# Patient Record
Sex: MALE | Race: WHITE | NOT HISPANIC OR LATINO | Employment: OTHER | URBAN - METROPOLITAN AREA
[De-identification: names, ages, dates, MRNs, and addresses within clinical notes are randomized per-mention and may not be internally consistent; named-entity substitution may affect disease eponyms.]

---

## 2024-01-29 ENCOUNTER — TELEPHONE (OUTPATIENT)
Dept: PRIMARY CARE | Facility: CLINIC | Age: 74
End: 2024-01-29
Payer: MEDICARE

## 2024-01-29 DIAGNOSIS — E78.1 ESSENTIAL HYPERTRIGLYCERIDEMIA: ICD-10-CM

## 2024-01-29 DIAGNOSIS — R79.89 ELEVATED FERRITIN: ICD-10-CM

## 2024-01-29 DIAGNOSIS — E78.49 OTHER HYPERLIPIDEMIA: ICD-10-CM

## 2024-01-29 DIAGNOSIS — R73.01 IMPAIRED FASTING GLUCOSE: ICD-10-CM

## 2024-01-29 DIAGNOSIS — N18.31 STAGE 3A CHRONIC KIDNEY DISEASE (MULTI): Primary | ICD-10-CM

## 2024-01-29 DIAGNOSIS — Z13.29 THYROID DISORDER SCREEN: ICD-10-CM

## 2024-01-29 DIAGNOSIS — Z12.5 SCREENING FOR PROSTATE CANCER: ICD-10-CM

## 2024-01-29 DIAGNOSIS — E55.9 VITAMIN D DEFICIENCY: ICD-10-CM

## 2024-01-29 PROBLEM — R91.1 LUNG NODULE: Status: ACTIVE | Noted: 2024-01-29

## 2024-01-29 PROBLEM — C49.20: Status: ACTIVE | Noted: 2024-01-29

## 2024-01-29 PROBLEM — R59.0 HILAR LYMPHADENOPATHY: Status: ACTIVE | Noted: 2024-01-29

## 2024-01-29 PROBLEM — K76.89 LIVER CYST: Status: ACTIVE | Noted: 2024-01-29

## 2024-01-29 PROBLEM — N52.9 ERECTILE DYSFUNCTION OF ORGANIC ORIGIN: Status: ACTIVE | Noted: 2024-01-29

## 2024-01-29 PROBLEM — N20.0 CALCULUS OF KIDNEY IN MALE: Status: ACTIVE | Noted: 2024-01-29

## 2024-01-30 ENCOUNTER — LAB (OUTPATIENT)
Dept: LAB | Facility: LAB | Age: 74
End: 2024-01-30
Payer: MEDICARE

## 2024-01-30 DIAGNOSIS — R73.01 IMPAIRED FASTING GLUCOSE: ICD-10-CM

## 2024-01-30 DIAGNOSIS — Z12.5 SCREENING FOR PROSTATE CANCER: ICD-10-CM

## 2024-01-30 DIAGNOSIS — Z13.29 THYROID DISORDER SCREEN: ICD-10-CM

## 2024-01-30 DIAGNOSIS — E78.49 OTHER HYPERLIPIDEMIA: ICD-10-CM

## 2024-01-30 DIAGNOSIS — E55.9 VITAMIN D DEFICIENCY: ICD-10-CM

## 2024-01-30 DIAGNOSIS — N18.31 STAGE 3A CHRONIC KIDNEY DISEASE (MULTI): ICD-10-CM

## 2024-01-30 DIAGNOSIS — E78.1 ESSENTIAL HYPERTRIGLYCERIDEMIA: ICD-10-CM

## 2024-01-30 DIAGNOSIS — R79.89 ELEVATED FERRITIN: ICD-10-CM

## 2024-01-30 LAB
25(OH)D3 SERPL-MCNC: 44 NG/ML (ref 30–100)
ALBUMIN SERPL BCP-MCNC: 4.2 G/DL (ref 3.4–5)
ALP SERPL-CCNC: 102 U/L (ref 33–136)
ALT SERPL W P-5'-P-CCNC: 20 U/L (ref 10–52)
ANION GAP SERPL CALC-SCNC: 10 MMOL/L (ref 10–20)
AST SERPL W P-5'-P-CCNC: 15 U/L (ref 9–39)
BILIRUB SERPL-MCNC: 0.6 MG/DL (ref 0–1.2)
BUN SERPL-MCNC: 20 MG/DL (ref 6–23)
CALCIUM SERPL-MCNC: 8.8 MG/DL (ref 8.6–10.3)
CHLORIDE SERPL-SCNC: 106 MMOL/L (ref 98–107)
CHOLEST SERPL-MCNC: 177 MG/DL (ref 0–199)
CHOLESTEROL/HDL RATIO: 4
CO2 SERPL-SCNC: 28 MMOL/L (ref 21–32)
CREAT SERPL-MCNC: 1.51 MG/DL (ref 0.5–1.3)
EGFRCR SERPLBLD CKD-EPI 2021: 49 ML/MIN/1.73M*2
ERYTHROCYTE [DISTWIDTH] IN BLOOD BY AUTOMATED COUNT: 12.8 % (ref 11.5–14.5)
EST. AVERAGE GLUCOSE BLD GHB EST-MCNC: 120 MG/DL
FERRITIN SERPL-MCNC: 299 NG/ML (ref 20–300)
GLUCOSE SERPL-MCNC: 112 MG/DL (ref 74–99)
HBA1C MFR BLD: 5.8 %
HCT VFR BLD AUTO: 42.4 % (ref 41–52)
HDLC SERPL-MCNC: 44.6 MG/DL
HGB BLD-MCNC: 14 G/DL (ref 13.5–17.5)
IRON SERPL-MCNC: 69 UG/DL (ref 35–150)
LDLC SERPL CALC-MCNC: 109 MG/DL
LDLC SERPL DIRECT ASSAY-MCNC: 119 MG/DL (ref 0–129)
MCH RBC QN AUTO: 30.5 PG (ref 26–34)
MCHC RBC AUTO-ENTMCNC: 33 G/DL (ref 32–36)
MCV RBC AUTO: 92 FL (ref 80–100)
NON HDL CHOLESTEROL: 132 MG/DL (ref 0–149)
NRBC BLD-RTO: 0 /100 WBCS (ref 0–0)
PLATELET # BLD AUTO: 213 X10*3/UL (ref 150–450)
POTASSIUM SERPL-SCNC: 4.2 MMOL/L (ref 3.5–5.3)
PROT SERPL-MCNC: 6.4 G/DL (ref 6.4–8.2)
PSA SERPL-MCNC: 1.22 NG/ML
RBC # BLD AUTO: 4.59 X10*6/UL (ref 4.5–5.9)
SODIUM SERPL-SCNC: 140 MMOL/L (ref 136–145)
TRIGL SERPL-MCNC: 118 MG/DL (ref 0–149)
TSH SERPL-ACNC: 3.59 MIU/L (ref 0.44–3.98)
VLDL: 24 MG/DL (ref 0–40)
WBC # BLD AUTO: 3.7 X10*3/UL (ref 4.4–11.3)

## 2024-01-30 PROCEDURE — 83540 ASSAY OF IRON: CPT

## 2024-01-30 PROCEDURE — 82728 ASSAY OF FERRITIN: CPT

## 2024-01-30 PROCEDURE — 84443 ASSAY THYROID STIM HORMONE: CPT

## 2024-01-30 PROCEDURE — 36415 COLL VENOUS BLD VENIPUNCTURE: CPT

## 2024-01-30 PROCEDURE — 82306 VITAMIN D 25 HYDROXY: CPT

## 2024-01-30 PROCEDURE — 85027 COMPLETE CBC AUTOMATED: CPT

## 2024-01-30 PROCEDURE — 80053 COMPREHEN METABOLIC PANEL: CPT

## 2024-01-30 PROCEDURE — G0103 PSA SCREENING: HCPCS

## 2024-01-30 PROCEDURE — 83036 HEMOGLOBIN GLYCOSYLATED A1C: CPT

## 2024-01-30 PROCEDURE — 80061 LIPID PANEL: CPT

## 2024-01-30 PROCEDURE — 83721 ASSAY OF BLOOD LIPOPROTEIN: CPT

## 2024-02-01 ENCOUNTER — OFFICE VISIT (OUTPATIENT)
Dept: PRIMARY CARE | Facility: CLINIC | Age: 74
End: 2024-02-01
Payer: MEDICARE

## 2024-02-01 VITALS
WEIGHT: 232.6 LBS | SYSTOLIC BLOOD PRESSURE: 105 MMHG | HEART RATE: 60 BPM | DIASTOLIC BLOOD PRESSURE: 68 MMHG | HEIGHT: 78 IN | BODY MASS INDEX: 26.91 KG/M2 | OXYGEN SATURATION: 95 % | TEMPERATURE: 98.4 F | RESPIRATION RATE: 18 BRPM

## 2024-02-01 DIAGNOSIS — N18.31 STAGE 3A CHRONIC KIDNEY DISEASE (MULTI): ICD-10-CM

## 2024-02-01 DIAGNOSIS — R73.01 IMPAIRED FASTING GLUCOSE: ICD-10-CM

## 2024-02-01 DIAGNOSIS — K76.89 LIVER CYST: ICD-10-CM

## 2024-02-01 DIAGNOSIS — C49.21: ICD-10-CM

## 2024-02-01 DIAGNOSIS — R91.1 LUNG NODULE: ICD-10-CM

## 2024-02-01 DIAGNOSIS — Z00.00 MEDICARE ANNUAL WELLNESS VISIT, SUBSEQUENT: Primary | ICD-10-CM

## 2024-02-01 PROCEDURE — 1123F ACP DISCUSS/DSCN MKR DOCD: CPT | Performed by: FAMILY MEDICINE

## 2024-02-01 PROCEDURE — 1036F TOBACCO NON-USER: CPT | Performed by: FAMILY MEDICINE

## 2024-02-01 PROCEDURE — 1159F MED LIST DOCD IN RCRD: CPT | Performed by: FAMILY MEDICINE

## 2024-02-01 PROCEDURE — 1170F FXNL STATUS ASSESSED: CPT | Performed by: FAMILY MEDICINE

## 2024-02-01 PROCEDURE — G0439 PPPS, SUBSEQ VISIT: HCPCS | Performed by: FAMILY MEDICINE

## 2024-02-01 PROCEDURE — 99213 OFFICE O/P EST LOW 20 MIN: CPT | Performed by: FAMILY MEDICINE

## 2024-02-01 ASSESSMENT — ACTIVITIES OF DAILY LIVING (ADL)
BATHING: INDEPENDENT
DOING_HOUSEWORK: INDEPENDENT
MANAGING_FINANCES: INDEPENDENT
TAKING_MEDICATION: INDEPENDENT
DRESSING: INDEPENDENT
GROCERY_SHOPPING: INDEPENDENT

## 2024-02-01 ASSESSMENT — ENCOUNTER SYMPTOMS
LOSS OF SENSATION IN FEET: 0
ARTHRALGIAS: 0
PALPITATIONS: 0
CHILLS: 0
FEVER: 0
DEPRESSION: 0
OCCASIONAL FEELINGS OF UNSTEADINESS: 0
CHEST TIGHTNESS: 0
CONFUSION: 0
ABDOMINAL PAIN: 0
SHORTNESS OF BREATH: 0

## 2024-02-01 ASSESSMENT — PATIENT HEALTH QUESTIONNAIRE - PHQ9
1. LITTLE INTEREST OR PLEASURE IN DOING THINGS: NOT AT ALL
SUM OF ALL RESPONSES TO PHQ9 QUESTIONS 1 AND 2: 0
2. FEELING DOWN, DEPRESSED OR HOPELESS: NOT AT ALL

## 2024-02-01 NOTE — ASSESSMENT & PLAN NOTE
Recent labs reviewed    Colonoscopy and PSA are up-to-date    Pneumococcal vaccine completed  We discussed recommendations with regards to RSV vaccine

## 2024-02-01 NOTE — ASSESSMENT & PLAN NOTE
Patient continues to follow with his specialist team on the [facility] has a follow-up appointment in April as long as things are stable he says they will be moving to once a year.

## 2024-02-01 NOTE — PROGRESS NOTES
"Subjective   Reason for Visit: Galindo Lewis is an 73 y.o. male here for a Medicare Wellness visit.          Reviewed all medications by prescribing practitioner or clinical pharmacist (such as prescriptions, OTCs, herbal therapies and supplements) and documented in the medical record.    HPI patient today for follow-up of ongoing healthcare issues and review of lab work overall is been feeling good.  He continues to follow with his sarcoma specialist at [facility] and has another checkup in April.  He had recent CT scans done this past fall of his chest abdomen and pelvis.    Patient Care Team:  Matty Webb MD as PCP - General     Review of Systems   Constitutional:  Negative for chills and fever.   HENT:  Negative for congestion and ear pain.    Eyes:  Negative for visual disturbance.   Respiratory:  Negative for chest tightness and shortness of breath.    Cardiovascular:  Negative for chest pain and palpitations.   Gastrointestinal:  Negative for abdominal pain.   Musculoskeletal:  Negative for arthralgias.   Skin:  Negative for pallor.   Psychiatric/Behavioral:  Negative for confusion.        Objective   Vitals:  /68   Pulse 60   Temp 36.9 °C (98.4 °F) (Temporal)   Resp 18   Ht 1.981 m (6' 6\")   Wt 106 kg (232 lb 9.6 oz)   SpO2 95%   BMI 26.88 kg/m²       Physical Exam  Vitals and nursing note reviewed.   Constitutional:       General: He is not in acute distress.     Appearance: Normal appearance. He is not ill-appearing.   HENT:      Head: Normocephalic and atraumatic.      Right Ear: Tympanic membrane, ear canal and external ear normal.      Left Ear: Tympanic membrane, ear canal and external ear normal.      Mouth/Throat:      Pharynx: Oropharynx is clear.   Eyes:      Extraocular Movements: Extraocular movements intact.   Cardiovascular:      Rate and Rhythm: Normal rate and regular rhythm.      Pulses: Normal pulses.      Heart sounds: Normal heart sounds.   Pulmonary:      " Effort: Pulmonary effort is normal.      Breath sounds: Normal breath sounds.   Abdominal:      General: Abdomen is flat. Bowel sounds are normal.      Palpations: Abdomen is soft.      Tenderness: There is no abdominal tenderness.   Musculoskeletal:         General: Normal range of motion.      Cervical back: Neck supple.   Skin:     General: Skin is warm.   Neurological:      Mental Status: He is alert and oriented to person, place, and time. Mental status is at baseline.   Psychiatric:         Mood and Affect: Mood normal.       Recent Results (from the past 1008 hour(s))   CBC    Collection Time: 01/30/24  8:21 AM   Result Value Ref Range    WBC 3.7 (L) 4.4 - 11.3 x10*3/uL    nRBC 0.0 0.0 - 0.0 /100 WBCs    RBC 4.59 4.50 - 5.90 x10*6/uL    Hemoglobin 14.0 13.5 - 17.5 g/dL    Hematocrit 42.4 41.0 - 52.0 %    MCV 92 80 - 100 fL    MCH 30.5 26.0 - 34.0 pg    MCHC 33.0 32.0 - 36.0 g/dL    RDW 12.8 11.5 - 14.5 %    Platelets 213 150 - 450 x10*3/uL   Cholesterol, LDL Direct    Collection Time: 01/30/24  8:21 AM   Result Value Ref Range    LDL, Direct 119 0 - 129 mg/dL   Comprehensive Metabolic Panel    Collection Time: 01/30/24  8:21 AM   Result Value Ref Range    Glucose 112 (H) 74 - 99 mg/dL    Sodium 140 136 - 145 mmol/L    Potassium 4.2 3.5 - 5.3 mmol/L    Chloride 106 98 - 107 mmol/L    Bicarbonate 28 21 - 32 mmol/L    Anion Gap 10 10 - 20 mmol/L    Urea Nitrogen 20 6 - 23 mg/dL    Creatinine 1.51 (H) 0.50 - 1.30 mg/dL    eGFR 49 (L) >60 mL/min/1.73m*2    Calcium 8.8 8.6 - 10.3 mg/dL    Albumin 4.2 3.4 - 5.0 g/dL    Alkaline Phosphatase 102 33 - 136 U/L    Total Protein 6.4 6.4 - 8.2 g/dL    AST 15 9 - 39 U/L    Bilirubin, Total 0.6 0.0 - 1.2 mg/dL    ALT 20 10 - 52 U/L   Hemoglobin A1C    Collection Time: 01/30/24  8:21 AM   Result Value Ref Range    Hemoglobin A1C 5.8 (H) see below %    Estimated Average Glucose 120 Not Established mg/dL   Ferritin    Collection Time: 01/30/24  8:21 AM   Result Value Ref Range     Ferritin 299 20 - 300 ng/mL   Iron    Collection Time: 01/30/24  8:21 AM   Result Value Ref Range    Iron 69 35 - 150 ug/dL   Lipid Panel    Collection Time: 01/30/24  8:21 AM   Result Value Ref Range    Cholesterol 177 0 - 199 mg/dL    HDL-Cholesterol 44.6 mg/dL    Cholesterol/HDL Ratio 4.0     LDL Calculated 109 (H) <=99 mg/dL    VLDL 24 0 - 40 mg/dL    Triglycerides 118 0 - 149 mg/dL    Non HDL Cholesterol 132 0 - 149 mg/dL   Vitamin D 25-Hydroxy,Total (for eval of Vitamin D levels)    Collection Time: 01/30/24  8:21 AM   Result Value Ref Range    Vitamin D, 25-Hydroxy, Total 44 30 - 100 ng/mL   TSH with reflex to Free T4 if abnormal    Collection Time: 01/30/24  8:21 AM   Result Value Ref Range    Thyroid Stimulating Hormone 3.59 0.44 - 3.98 mIU/L   Prostate Specific Antigen, Screen    Collection Time: 01/30/24  8:21 AM   Result Value Ref Range    Prostate Specific Antigen,Screen 1.22 <=4.00 ng/mL     Recent labs reviewed with patient  Follow low-fat low-cholesterol diabetic diet    Continue to follow with his sarcoma specialist at [facility]  Return to our office 6 months with repeat fasting labs      Assessment/Plan   Problem List Items Addressed This Visit       Impaired fasting glucose    Current Assessment & Plan     A1c 5.8% continue dietary modification         Relevant Orders    Follow Up In Primary Care - Established    Comprehensive Metabolic Panel    Hemoglobin A1C    Liver cyst    Current Assessment & Plan     Stable on CT of abdomen pelvis October 2023 from [facility]         Lung nodule    Current Assessment & Plan     Stable on recent CT of the chest October 2023         Soft tissue sarcoma of thigh (CMS/HCC)    Current Assessment & Plan     Patient continues to follow with his specialist team on the [facility] has a follow-up appointment in April as long as things are stable he says they will be moving to once a year.         Relevant  Orders    Follow Up In Primary Care - Established    Comprehensive Metabolic Panel    CBC    Stage 3a chronic kidney disease (CMS/HCC)    Current Assessment & Plan     Stable continue to monitor         Relevant Orders    Follow Up In Primary Care - Established    Comprehensive Metabolic Panel    CBC    Medicare annual wellness visit, subsequent - Primary    Current Assessment & Plan     Recent labs reviewed    Colonoscopy and PSA are up-to-date    Pneumococcal vaccine completed  We discussed recommendations with regards to RSV vaccine

## 2024-04-30 ENCOUNTER — OFFICE VISIT (OUTPATIENT)
Dept: PRIMARY CARE | Facility: CLINIC | Age: 74
End: 2024-04-30
Payer: MEDICARE

## 2024-04-30 VITALS
OXYGEN SATURATION: 94 % | DIASTOLIC BLOOD PRESSURE: 64 MMHG | BODY MASS INDEX: 26.81 KG/M2 | WEIGHT: 232 LBS | RESPIRATION RATE: 15 BRPM | SYSTOLIC BLOOD PRESSURE: 105 MMHG | HEART RATE: 63 BPM

## 2024-04-30 DIAGNOSIS — N40.2 PROSTATE NODULE WITHOUT URINARY OBSTRUCTION: Primary | ICD-10-CM

## 2024-04-30 PROCEDURE — 1159F MED LIST DOCD IN RCRD: CPT | Performed by: FAMILY MEDICINE

## 2024-04-30 PROCEDURE — 1160F RVW MEDS BY RX/DR IN RCRD: CPT | Performed by: FAMILY MEDICINE

## 2024-04-30 PROCEDURE — 99213 OFFICE O/P EST LOW 20 MIN: CPT | Performed by: FAMILY MEDICINE

## 2024-04-30 PROCEDURE — 1036F TOBACCO NON-USER: CPT | Performed by: FAMILY MEDICINE

## 2024-04-30 PROCEDURE — 1123F ACP DISCUSS/DSCN MKR DOCD: CPT | Performed by: FAMILY MEDICINE

## 2024-04-30 ASSESSMENT — ENCOUNTER SYMPTOMS
CARDIOVASCULAR NEGATIVE: 1
CONSTITUTIONAL NEGATIVE: 1
RESPIRATORY NEGATIVE: 1

## 2024-04-30 NOTE — ASSESSMENT & PLAN NOTE
CT scan of abdomen and pelvis done at [facility] reviewed with patient showing a 1 x 1.3 cm left posterior prostate enhancement.    Recent PSA January 2024 was 1.22    Will refer to urology for second opinion on further evaluation.

## 2024-04-30 NOTE — PROGRESS NOTES
Subjective   Patient ID: Galindo Lewis is a 73 y.o. male who presents for Follow-up (Test results).    HPI   Patient in today for follow-up on recent prostate abnormality that was noted on his CT of abdomen and pelvis surveillance scan at [facility].  He is followed there for his history of thigh sarcoma.  He denies any urinary symptoms or changes in urinary flow habits.  Recent PSA in January of this year was normal.  Review of Systems   Constitutional: Negative.    Respiratory: Negative.     Cardiovascular: Negative.    Genitourinary: Negative.        Objective   /64   Pulse 63   Resp 15   Wt 105 kg (232 lb)   SpO2 94%   BMI 26.81 kg/m²     Physical Exam  Constitutional:       Appearance: Normal appearance. He is not ill-appearing.   Cardiovascular:      Rate and Rhythm: Normal rate.      Heart sounds: Normal heart sounds.   Pulmonary:      Effort: Pulmonary effort is normal. No respiratory distress.      Breath sounds: Normal breath sounds.   Genitourinary:     Rectum: Normal.      Comments: Small left posterior prostate nodule.    CT scan abdomen and pelvis from last universally reviewed with patient showing 1 x 1.3 cm left posterior prostate enhancement.  Recent PSA in January of this year was normal at 1.22    Will refer to urology for second opinion and further evaluation    Keep Follow-up appointment with us in about 3 months    Assessment/Plan   Problem List Items Addressed This Visit             ICD-10-CM    Prostate nodule without urinary obstruction - Primary N40.2     CT scan of abdomen and pelvis done at [facility] reviewed with patient showing a 1 x 1.3 cm left posterior prostate enhancement.    Recent PSA January 2024 was 1.22    Will refer to urology for second opinion on further evaluation.         Relevant Orders    Referral to Urology

## 2024-05-01 ENCOUNTER — APPOINTMENT (OUTPATIENT)
Dept: PRIMARY CARE | Facility: CLINIC | Age: 74
End: 2024-05-01
Payer: MEDICARE

## 2024-05-06 DIAGNOSIS — N40.2 PROSTATE NODULE WITHOUT URINARY OBSTRUCTION: Primary | ICD-10-CM

## 2024-05-29 ENCOUNTER — HOSPITAL ENCOUNTER (OUTPATIENT)
Dept: RADIOLOGY | Facility: CLINIC | Age: 74
Discharge: HOME | End: 2024-05-29
Payer: MEDICARE

## 2024-05-29 DIAGNOSIS — N40.2 PROSTATE NODULE WITHOUT URINARY OBSTRUCTION: ICD-10-CM

## 2024-05-29 PROCEDURE — A9575 INJ GADOTERATE MEGLUMI 0.1ML: HCPCS | Performed by: STUDENT IN AN ORGANIZED HEALTH CARE EDUCATION/TRAINING PROGRAM

## 2024-05-29 PROCEDURE — 2550000001 HC RX 255 CONTRASTS: Performed by: STUDENT IN AN ORGANIZED HEALTH CARE EDUCATION/TRAINING PROGRAM

## 2024-05-29 PROCEDURE — 72197 MRI PELVIS W/O & W/DYE: CPT

## 2024-05-29 PROCEDURE — 72197 MRI PELVIS W/O & W/DYE: CPT | Performed by: RADIOLOGY

## 2024-05-29 RX ORDER — GADOTERATE MEGLUMINE 376.9 MG/ML
0.2 INJECTION INTRAVENOUS
Status: COMPLETED | OUTPATIENT
Start: 2024-05-29 | End: 2024-05-29

## 2024-05-29 RX ADMIN — GADOTERATE MEGLUMINE 20 ML: 376.9 INJECTION INTRAVENOUS at 14:23

## 2024-07-31 ENCOUNTER — LAB (OUTPATIENT)
Dept: LAB | Facility: LAB | Age: 74
End: 2024-07-31
Payer: MEDICARE

## 2024-07-31 DIAGNOSIS — C49.21: ICD-10-CM

## 2024-07-31 DIAGNOSIS — R73.01 IMPAIRED FASTING GLUCOSE: ICD-10-CM

## 2024-07-31 DIAGNOSIS — N18.31 STAGE 3A CHRONIC KIDNEY DISEASE (MULTI): ICD-10-CM

## 2024-07-31 LAB
ALBUMIN SERPL BCP-MCNC: 4.2 G/DL (ref 3.4–5)
ALP SERPL-CCNC: 92 U/L (ref 33–136)
ALT SERPL W P-5'-P-CCNC: 15 U/L (ref 10–52)
ANION GAP SERPL CALC-SCNC: 8 MMOL/L (ref 10–20)
AST SERPL W P-5'-P-CCNC: 15 U/L (ref 9–39)
BILIRUB SERPL-MCNC: 0.7 MG/DL (ref 0–1.2)
BUN SERPL-MCNC: 19 MG/DL (ref 6–23)
CALCIUM SERPL-MCNC: 8.7 MG/DL (ref 8.6–10.3)
CHLORIDE SERPL-SCNC: 106 MMOL/L (ref 98–107)
CO2 SERPL-SCNC: 29 MMOL/L (ref 21–32)
CREAT SERPL-MCNC: 1.56 MG/DL (ref 0.5–1.3)
EGFRCR SERPLBLD CKD-EPI 2021: 47 ML/MIN/1.73M*2
ERYTHROCYTE [DISTWIDTH] IN BLOOD BY AUTOMATED COUNT: 12.9 % (ref 11.5–14.5)
EST. AVERAGE GLUCOSE BLD GHB EST-MCNC: 126 MG/DL
GLUCOSE SERPL-MCNC: 107 MG/DL (ref 74–99)
HBA1C MFR BLD: 6 %
HCT VFR BLD AUTO: 42.5 % (ref 41–52)
HGB BLD-MCNC: 14 G/DL (ref 13.5–17.5)
MCH RBC QN AUTO: 30.9 PG (ref 26–34)
MCHC RBC AUTO-ENTMCNC: 32.9 G/DL (ref 32–36)
MCV RBC AUTO: 94 FL (ref 80–100)
NRBC BLD-RTO: 0 /100 WBCS (ref 0–0)
PLATELET # BLD AUTO: 202 X10*3/UL (ref 150–450)
POTASSIUM SERPL-SCNC: 4.8 MMOL/L (ref 3.5–5.3)
PROT SERPL-MCNC: 6.5 G/DL (ref 6.4–8.2)
RBC # BLD AUTO: 4.53 X10*6/UL (ref 4.5–5.9)
SODIUM SERPL-SCNC: 138 MMOL/L (ref 136–145)
WBC # BLD AUTO: 3.3 X10*3/UL (ref 4.4–11.3)

## 2024-07-31 PROCEDURE — 36415 COLL VENOUS BLD VENIPUNCTURE: CPT

## 2024-07-31 PROCEDURE — 80053 COMPREHEN METABOLIC PANEL: CPT

## 2024-07-31 PROCEDURE — 85027 COMPLETE CBC AUTOMATED: CPT

## 2024-07-31 PROCEDURE — 83036 HEMOGLOBIN GLYCOSYLATED A1C: CPT

## 2024-08-05 ENCOUNTER — APPOINTMENT (OUTPATIENT)
Dept: PRIMARY CARE | Facility: CLINIC | Age: 74
End: 2024-08-05
Payer: MEDICARE

## 2024-08-05 VITALS
RESPIRATION RATE: 14 BRPM | HEART RATE: 67 BPM | TEMPERATURE: 97 F | HEIGHT: 78 IN | DIASTOLIC BLOOD PRESSURE: 70 MMHG | BODY MASS INDEX: 26.15 KG/M2 | SYSTOLIC BLOOD PRESSURE: 110 MMHG | OXYGEN SATURATION: 93 % | WEIGHT: 226 LBS

## 2024-08-05 DIAGNOSIS — E55.9 VITAMIN D DEFICIENCY: ICD-10-CM

## 2024-08-05 DIAGNOSIS — C49.21: ICD-10-CM

## 2024-08-05 DIAGNOSIS — R73.01 IMPAIRED FASTING GLUCOSE: Primary | ICD-10-CM

## 2024-08-05 DIAGNOSIS — E78.1 ESSENTIAL HYPERTRIGLYCERIDEMIA: ICD-10-CM

## 2024-08-05 DIAGNOSIS — N40.2 PROSTATE NODULE WITHOUT URINARY OBSTRUCTION: ICD-10-CM

## 2024-08-05 DIAGNOSIS — Z13.29 THYROID DISORDER SCREEN: ICD-10-CM

## 2024-08-05 DIAGNOSIS — N18.31 STAGE 3A CHRONIC KIDNEY DISEASE (MULTI): ICD-10-CM

## 2024-08-05 DIAGNOSIS — Z12.5 SCREENING FOR PROSTATE CANCER: ICD-10-CM

## 2024-08-05 PROCEDURE — 1123F ACP DISCUSS/DSCN MKR DOCD: CPT | Performed by: FAMILY MEDICINE

## 2024-08-05 PROCEDURE — 3008F BODY MASS INDEX DOCD: CPT | Performed by: FAMILY MEDICINE

## 2024-08-05 PROCEDURE — 99213 OFFICE O/P EST LOW 20 MIN: CPT | Performed by: FAMILY MEDICINE

## 2024-08-05 PROCEDURE — 1159F MED LIST DOCD IN RCRD: CPT | Performed by: FAMILY MEDICINE

## 2024-08-05 PROCEDURE — 1160F RVW MEDS BY RX/DR IN RCRD: CPT | Performed by: FAMILY MEDICINE

## 2024-08-05 PROCEDURE — 1036F TOBACCO NON-USER: CPT | Performed by: FAMILY MEDICINE

## 2024-08-05 ASSESSMENT — ENCOUNTER SYMPTOMS
ABDOMINAL PAIN: 0
CHEST TIGHTNESS: 0
PALPITATIONS: 0
CHILLS: 0
CONFUSION: 0
SHORTNESS OF BREATH: 0
FEVER: 0
ARTHRALGIAS: 0

## 2024-08-05 NOTE — ASSESSMENT & PLAN NOTE
Evaluated by urology MRI of the prostate did reveal prostate lesion but for now they have decided to take a conservative approach and follow-up PSAs.

## 2024-08-05 NOTE — PROGRESS NOTES
"Subjective   Patient ID: Galindo Lewis is a 73 y.o. male who presents for Follow-up (6 month ).    HPI patient today for follow-up overall is been feeling okay saw urology with regards to prostate nodule MRI of the prostate confirmed presence of a nodule however at this point after discussing the case with his urologist they decided to take a conservative approach and follow PSAs if they start to trend upward then they will reassess.  He states he is on an annual schedule now at [facility] to follow-up on his sarcoma.    Review of Systems   Constitutional:  Negative for chills and fever.   HENT:  Negative for congestion and ear pain.    Eyes:  Negative for visual disturbance.   Respiratory:  Negative for chest tightness and shortness of breath.    Cardiovascular:  Negative for chest pain and palpitations.   Gastrointestinal:  Negative for abdominal pain.   Musculoskeletal:  Negative for arthralgias.   Skin:  Negative for pallor.   Psychiatric/Behavioral:  Negative for confusion.        Objective   /70   Pulse 67   Temp 36.1 °C (97 °F)   Resp 14   Ht 1.981 m (6' 6\")   Wt 103 kg (226 lb)   SpO2 93%   BMI 26.12 kg/m²     Physical Exam  Vitals and nursing note reviewed.   Constitutional:       General: He is not in acute distress.     Appearance: Normal appearance. He is not ill-appearing.   HENT:      Head: Normocephalic and atraumatic.      Right Ear: Tympanic membrane, ear canal and external ear normal.      Left Ear: Tympanic membrane, ear canal and external ear normal.      Mouth/Throat:      Pharynx: Oropharynx is clear.   Eyes:      Extraocular Movements: Extraocular movements intact.   Cardiovascular:      Rate and Rhythm: Normal rate and regular rhythm.      Pulses: Normal pulses.      Heart sounds: Normal heart sounds.   Pulmonary:      Effort: Pulmonary effort is normal.      Breath sounds: Normal breath sounds.   Abdominal:      General: Abdomen is flat. Bowel sounds are normal.    "   Palpations: Abdomen is soft.      Tenderness: There is no abdominal tenderness.   Musculoskeletal:         General: Normal range of motion.      Cervical back: Neck supple.   Skin:     General: Skin is warm.   Neurological:      Mental Status: He is alert and oriented to person, place, and time. Mental status is at baseline.   Psychiatric:         Mood and Affect: Mood normal.       Recent Results (from the past 1008 hour(s))   Comprehensive Metabolic Panel    Collection Time: 07/31/24  8:26 AM   Result Value Ref Range    Glucose 107 (H) 74 - 99 mg/dL    Sodium 138 136 - 145 mmol/L    Potassium 4.8 3.5 - 5.3 mmol/L    Chloride 106 98 - 107 mmol/L    Bicarbonate 29 21 - 32 mmol/L    Anion Gap 8 (L) 10 - 20 mmol/L    Urea Nitrogen 19 6 - 23 mg/dL    Creatinine 1.56 (H) 0.50 - 1.30 mg/dL    eGFR 47 (L) >60 mL/min/1.73m*2    Calcium 8.7 8.6 - 10.3 mg/dL    Albumin 4.2 3.4 - 5.0 g/dL    Alkaline Phosphatase 92 33 - 136 U/L    Total Protein 6.5 6.4 - 8.2 g/dL    AST 15 9 - 39 U/L    Bilirubin, Total 0.7 0.0 - 1.2 mg/dL    ALT 15 10 - 52 U/L   Hemoglobin A1C    Collection Time: 07/31/24  8:26 AM   Result Value Ref Range    Hemoglobin A1C 6.0 (H) see below %    Estimated Average Glucose 126 Not Established mg/dL   CBC    Collection Time: 07/31/24  8:26 AM   Result Value Ref Range    WBC 3.3 (L) 4.4 - 11.3 x10*3/uL    nRBC 0.0 0.0 - 0.0 /100 WBCs    RBC 4.53 4.50 - 5.90 x10*6/uL    Hemoglobin 14.0 13.5 - 17.5 g/dL    Hematocrit 42.5 41.0 - 52.0 %    MCV 94 80 - 100 fL    MCH 30.9 26.0 - 34.0 pg    MCHC 32.9 32.0 - 36.0 g/dL    RDW 12.9 11.5 - 14.5 %    Platelets 202 150 - 450 x10*3/uL     Recent labs reviewed with patient overall numbers were stable for patient we will continue to monitor.  Discussed recommendations for flu shot and RSV vaccine this fall    Return in December with repeat fasting lab work including PSA.      Assessment/Plan   Problem List Items Addressed This Visit             ICD-10-CM    Essential  hypertriglyceridemia E78.1     Stable continue dietary modification         Impaired fasting glucose - Primary R73.01     A1c 6.0% reviewed diabetic diet         Soft tissue sarcoma of thigh (Multi) C49.20    Stage 3a chronic kidney disease (Multi) N18.31     Stable continue to monitor         Screening for prostate cancer Z12.5     Update PSA with next lab draw the end of the year         Thyroid disorder screen Z13.29     TSH with reflex         Vitamin D deficiency E55.9     Continue to monitor         Prostate nodule without urinary obstruction N40.2     Evaluated by urology MRI of the prostate did reveal prostate lesion but for now they have decided to take a conservative approach and follow-up PSAs.

## 2024-08-22 DIAGNOSIS — R31.21 ASYMPTOMATIC MICROSCOPIC HEMATURIA: Primary | ICD-10-CM

## 2024-10-08 ENCOUNTER — TELEPHONE (OUTPATIENT)
Dept: PHARMACY | Facility: HOSPITAL | Age: 74
End: 2024-10-08
Payer: MEDICARE

## 2024-10-08 NOTE — TELEPHONE ENCOUNTER
A voicemail was left for the patient regarding enrollment in the chronic kidney disease  program. The message included information about the program's resources. The patient was encouraged to call back for further details.    This is their first attempt.

## 2024-12-02 ENCOUNTER — LAB (OUTPATIENT)
Dept: LAB | Facility: LAB | Age: 74
End: 2024-12-02
Payer: MEDICARE

## 2024-12-02 DIAGNOSIS — Z12.5 SCREENING FOR PROSTATE CANCER: ICD-10-CM

## 2024-12-02 DIAGNOSIS — C49.21: ICD-10-CM

## 2024-12-02 DIAGNOSIS — N18.31 STAGE 3A CHRONIC KIDNEY DISEASE (MULTI): ICD-10-CM

## 2024-12-02 DIAGNOSIS — E55.9 VITAMIN D DEFICIENCY: ICD-10-CM

## 2024-12-02 DIAGNOSIS — Z13.29 THYROID DISORDER SCREEN: ICD-10-CM

## 2024-12-02 DIAGNOSIS — R73.01 IMPAIRED FASTING GLUCOSE: ICD-10-CM

## 2024-12-02 DIAGNOSIS — N40.2 PROSTATE NODULE WITHOUT URINARY OBSTRUCTION: ICD-10-CM

## 2024-12-02 DIAGNOSIS — E78.1 ESSENTIAL HYPERTRIGLYCERIDEMIA: ICD-10-CM

## 2024-12-02 LAB
25(OH)D3 SERPL-MCNC: 40 NG/ML (ref 30–100)
ALBUMIN SERPL BCP-MCNC: 4.6 G/DL (ref 3.4–5)
ALP SERPL-CCNC: 109 U/L (ref 33–136)
ALT SERPL W P-5'-P-CCNC: 22 U/L (ref 10–52)
ANION GAP SERPL CALC-SCNC: 13 MMOL/L (ref 10–20)
AST SERPL W P-5'-P-CCNC: 18 U/L (ref 9–39)
BILIRUB SERPL-MCNC: 0.6 MG/DL (ref 0–1.2)
BUN SERPL-MCNC: 18 MG/DL (ref 6–23)
CALCIUM SERPL-MCNC: 9.3 MG/DL (ref 8.6–10.3)
CHLORIDE SERPL-SCNC: 102 MMOL/L (ref 98–107)
CHOLEST SERPL-MCNC: 231 MG/DL (ref 0–199)
CHOLESTEROL/HDL RATIO: 4.2
CO2 SERPL-SCNC: 28 MMOL/L (ref 21–32)
CREAT SERPL-MCNC: 1.43 MG/DL (ref 0.5–1.3)
EGFRCR SERPLBLD CKD-EPI 2021: 52 ML/MIN/1.73M*2
ERYTHROCYTE [DISTWIDTH] IN BLOOD BY AUTOMATED COUNT: 13 % (ref 11.5–14.5)
GLUCOSE SERPL-MCNC: 106 MG/DL (ref 74–99)
HCT VFR BLD AUTO: 46.6 % (ref 41–52)
HDLC SERPL-MCNC: 55.4 MG/DL
HGB BLD-MCNC: 15.2 G/DL (ref 13.5–17.5)
LDLC SERPL CALC-MCNC: 146 MG/DL
MCH RBC QN AUTO: 30.3 PG (ref 26–34)
MCHC RBC AUTO-ENTMCNC: 32.6 G/DL (ref 32–36)
MCV RBC AUTO: 93 FL (ref 80–100)
NON HDL CHOLESTEROL: 176 MG/DL (ref 0–149)
NRBC BLD-RTO: 0 /100 WBCS (ref 0–0)
PLATELET # BLD AUTO: 222 X10*3/UL (ref 150–450)
POTASSIUM SERPL-SCNC: 4.8 MMOL/L (ref 3.5–5.3)
PROT SERPL-MCNC: 7 G/DL (ref 6.4–8.2)
PSA SERPL-MCNC: 1.47 NG/ML
RBC # BLD AUTO: 5.01 X10*6/UL (ref 4.5–5.9)
SODIUM SERPL-SCNC: 138 MMOL/L (ref 136–145)
TRIGL SERPL-MCNC: 148 MG/DL (ref 0–149)
TSH SERPL-ACNC: 3.08 MIU/L (ref 0.44–3.98)
VLDL: 30 MG/DL (ref 0–40)
WBC # BLD AUTO: 4.3 X10*3/UL (ref 4.4–11.3)

## 2024-12-02 PROCEDURE — 84443 ASSAY THYROID STIM HORMONE: CPT

## 2024-12-02 PROCEDURE — 80053 COMPREHEN METABOLIC PANEL: CPT

## 2024-12-02 PROCEDURE — 82306 VITAMIN D 25 HYDROXY: CPT

## 2024-12-02 PROCEDURE — G0103 PSA SCREENING: HCPCS

## 2024-12-02 PROCEDURE — 83036 HEMOGLOBIN GLYCOSYLATED A1C: CPT

## 2024-12-02 PROCEDURE — 80061 LIPID PANEL: CPT

## 2024-12-02 PROCEDURE — 85027 COMPLETE CBC AUTOMATED: CPT

## 2024-12-02 PROCEDURE — 36415 COLL VENOUS BLD VENIPUNCTURE: CPT

## 2024-12-03 LAB
EST. AVERAGE GLUCOSE BLD GHB EST-MCNC: 111 MG/DL
HBA1C MFR BLD: 5.5 %

## 2024-12-10 ENCOUNTER — APPOINTMENT (OUTPATIENT)
Dept: PRIMARY CARE | Facility: CLINIC | Age: 74
End: 2024-12-10
Payer: MEDICARE

## 2024-12-10 VITALS
RESPIRATION RATE: 15 BRPM | HEART RATE: 80 BPM | TEMPERATURE: 97.2 F | WEIGHT: 230 LBS | BODY MASS INDEX: 26.58 KG/M2 | OXYGEN SATURATION: 96 % | SYSTOLIC BLOOD PRESSURE: 107 MMHG | DIASTOLIC BLOOD PRESSURE: 65 MMHG

## 2024-12-10 DIAGNOSIS — N40.2 PROSTATE NODULE WITHOUT URINARY OBSTRUCTION: ICD-10-CM

## 2024-12-10 DIAGNOSIS — R73.01 IMPAIRED FASTING GLUCOSE: ICD-10-CM

## 2024-12-10 DIAGNOSIS — E78.1 ESSENTIAL HYPERTRIGLYCERIDEMIA: ICD-10-CM

## 2024-12-10 DIAGNOSIS — N18.31 STAGE 3A CHRONIC KIDNEY DISEASE (MULTI): Primary | ICD-10-CM

## 2024-12-10 DIAGNOSIS — C49.21: ICD-10-CM

## 2024-12-10 PROCEDURE — G2211 COMPLEX E/M VISIT ADD ON: HCPCS | Performed by: FAMILY MEDICINE

## 2024-12-10 PROCEDURE — 99213 OFFICE O/P EST LOW 20 MIN: CPT | Performed by: FAMILY MEDICINE

## 2024-12-10 PROCEDURE — 1160F RVW MEDS BY RX/DR IN RCRD: CPT | Performed by: FAMILY MEDICINE

## 2024-12-10 PROCEDURE — 1159F MED LIST DOCD IN RCRD: CPT | Performed by: FAMILY MEDICINE

## 2024-12-10 PROCEDURE — 1036F TOBACCO NON-USER: CPT | Performed by: FAMILY MEDICINE

## 2024-12-10 PROCEDURE — 1123F ACP DISCUSS/DSCN MKR DOCD: CPT | Performed by: FAMILY MEDICINE

## 2024-12-10 ASSESSMENT — ENCOUNTER SYMPTOMS
SHORTNESS OF BREATH: 0
PALPITATIONS: 0
CHEST TIGHTNESS: 0
ABDOMINAL PAIN: 0
CHILLS: 0
ARTHRALGIAS: 0
CONFUSION: 0
FEVER: 0

## 2024-12-10 NOTE — ASSESSMENT & PLAN NOTE
GFR has actually improved continue to monitor.  We discussed that given his chronic kidney disease and history of impaired fasting glucose that he would be a candidate to consider medication such as Farxiga or Jardiance we discussed side effects risk benefit he verbalizes understanding but right now is not interested in starting either of the medicines.    In addition to lab work we will check urine microalbumin with next blood draw.

## 2024-12-10 NOTE — ASSESSMENT & PLAN NOTE
Clinically stable PSA normal range patient missed his December appoint with urology he was advised that he needs to call to get that rescheduled he verbalized understanding.

## 2024-12-10 NOTE — PROGRESS NOTES
Subjective   Patient ID: Galindo Lewis is a 74 y.o. male who presents for Follow-up (4 month).    HPI patient today for follow-up of ongoing healthcare issues and review of recent lab work overall has been doing well.  He says he did miss his urology appointment earlier this month but will call to try to get it rescheduled.  But denies any acute changes in urinary habits.  He is on an annual follow-up basis with his specialist at [facility] regarding his right thigh sarcoma.    Review of Systems   Constitutional:  Negative for chills and fever.   HENT:  Negative for congestion and ear pain.    Eyes:  Negative for visual disturbance.   Respiratory:  Negative for chest tightness and shortness of breath.    Cardiovascular:  Negative for chest pain and palpitations.   Gastrointestinal:  Negative for abdominal pain.   Musculoskeletal:  Negative for arthralgias.   Skin:  Negative for pallor.   Psychiatric/Behavioral:  Negative for confusion.        Objective   /65   Pulse 80   Temp 36.2 °C (97.2 °F)   Resp 15   Wt 104 kg (230 lb)   SpO2 96%   BMI 26.58 kg/m²     Physical Exam  Vitals and nursing note reviewed.   Constitutional:       General: He is not in acute distress.     Appearance: Normal appearance. He is not ill-appearing.   HENT:      Head: Normocephalic and atraumatic.      Right Ear: Tympanic membrane, ear canal and external ear normal.      Left Ear: Tympanic membrane, ear canal and external ear normal.      Mouth/Throat:      Pharynx: Oropharynx is clear.   Eyes:      Extraocular Movements: Extraocular movements intact.   Cardiovascular:      Rate and Rhythm: Normal rate and regular rhythm.      Pulses: Normal pulses.      Heart sounds: Normal heart sounds.   Pulmonary:      Effort: Pulmonary effort is normal.      Breath sounds: Normal breath sounds.   Abdominal:      General: Abdomen is flat. Bowel sounds are normal.      Palpations: Abdomen is soft.      Tenderness: There is no  abdominal tenderness.   Musculoskeletal:         General: Normal range of motion.      Cervical back: Neck supple.   Skin:     General: Skin is warm.   Neurological:      Mental Status: He is alert and oriented to person, place, and time. Mental status is at baseline.   Psychiatric:         Mood and Affect: Mood normal.       Recent Results (from the past 6 weeks)   CBC    Collection Time: 12/02/24 10:15 AM   Result Value Ref Range    WBC 4.3 (L) 4.4 - 11.3 x10*3/uL    nRBC 0.0 0.0 - 0.0 /100 WBCs    RBC 5.01 4.50 - 5.90 x10*6/uL    Hemoglobin 15.2 13.5 - 17.5 g/dL    Hematocrit 46.6 41.0 - 52.0 %    MCV 93 80 - 100 fL    MCH 30.3 26.0 - 34.0 pg    MCHC 32.6 32.0 - 36.0 g/dL    RDW 13.0 11.5 - 14.5 %    Platelets 222 150 - 450 x10*3/uL   Comprehensive Metabolic Panel    Collection Time: 12/02/24 10:15 AM   Result Value Ref Range    Glucose 106 (H) 74 - 99 mg/dL    Sodium 138 136 - 145 mmol/L    Potassium 4.8 3.5 - 5.3 mmol/L    Chloride 102 98 - 107 mmol/L    Bicarbonate 28 21 - 32 mmol/L    Anion Gap 13 10 - 20 mmol/L    Urea Nitrogen 18 6 - 23 mg/dL    Creatinine 1.43 (H) 0.50 - 1.30 mg/dL    eGFR 52 (L) >60 mL/min/1.73m*2    Calcium 9.3 8.6 - 10.3 mg/dL    Albumin 4.6 3.4 - 5.0 g/dL    Alkaline Phosphatase 109 33 - 136 U/L    Total Protein 7.0 6.4 - 8.2 g/dL    AST 18 9 - 39 U/L    Bilirubin, Total 0.6 0.0 - 1.2 mg/dL    ALT 22 10 - 52 U/L   Lipid Panel    Collection Time: 12/02/24 10:15 AM   Result Value Ref Range    Cholesterol 231 (H) 0 - 199 mg/dL    HDL-Cholesterol 55.4 mg/dL    Cholesterol/HDL Ratio 4.2     LDL Calculated 146 (H) <=99 mg/dL    VLDL 30 0 - 40 mg/dL    Triglycerides 148 0 - 149 mg/dL    Non HDL Cholesterol 176 (H) 0 - 149 mg/dL   Hemoglobin A1C    Collection Time: 12/02/24 10:15 AM   Result Value Ref Range    Hemoglobin A1C 5.5 See comment %    Estimated Average Glucose 111 Not Established mg/dL   Vitamin D 25-Hydroxy,Total (for eval of Vitamin D levels)    Collection Time: 12/02/24 10:15 AM    Result Value Ref Range    Vitamin D, 25-Hydroxy, Total 40 30 - 100 ng/mL   TSH with reflex to Free T4 if abnormal    Collection Time: 12/02/24 10:15 AM   Result Value Ref Range    Thyroid Stimulating Hormone 3.08 0.44 - 3.98 mIU/L   Prostate Specific Antigen, Screen    Collection Time: 12/02/24 10:15 AM   Result Value Ref Range    Prostate Specific Antigen,Screen 1.47 <=4.00 ng/mL     Labs reviewed overall numbers are stable for patient.  GFR slightly improved.    Again he will consider starting Farxiga or Jardiance but at this point is not interested in pursuing.  He is encouraged to follow-up with urology as well as continue to follow with his specialist at [facility].    Refuses flu shot as well as RSV already completed the pneumonia vaccine series.    He is return to office in 6 months with repeat fasting labs      Assessment/Plan   Problem List Items Addressed This Visit             ICD-10-CM    Essential hypertriglyceridemia E78.1     Continue dietary modifications fasting lipid         Relevant Orders    Comprehensive Metabolic Panel    Lipid Panel    Follow Up In Primary Care - Established    Impaired fasting glucose R73.01     A1c down to 5.5% continue dietary modification         Relevant Orders    CBC    Albumin-Creatinine Ratio, Urine Random    Comprehensive Metabolic Panel    Hemoglobin A1C    Follow Up In Primary Care - Established    Soft tissue sarcoma of thigh (Multi) C49.20     Clinically stable now follows annually with his specialist that the [facility]         Stage 3a chronic kidney disease (Multi) - Primary N18.31     GFR has actually improved continue to monitor.  We discussed that given his chronic kidney disease and history of impaired fasting glucose that he would be a candidate to consider medication such as Farxiga or Jardiance we discussed side effects risk benefit he verbalizes understanding but right now is not interested in starting either of the  medicines.    In addition to lab work we will check urine microalbumin with next blood draw.         Relevant Orders    CBC    Comprehensive Metabolic Panel    Follow Up In Primary Care - Established    Prostate nodule without urinary obstruction N40.2     Clinically stable PSA normal range patient missed his December appoint with urology he was advised that he needs to call to get that rescheduled he verbalized understanding.

## 2024-12-16 DIAGNOSIS — N18.31 STAGE 3A CHRONIC KIDNEY DISEASE (MULTI): Primary | ICD-10-CM

## 2025-01-07 ENCOUNTER — TELEPHONE (OUTPATIENT)
Dept: NEPHROLOGY | Facility: HOSPITAL | Age: 75
End: 2025-01-07
Payer: MEDICARE

## 2025-01-07 NOTE — TELEPHONE ENCOUNTER
Mr. Lewis was transferred over from Central Scheduling, patient thought he had an appointment today. Spoke with patient and advised I don't see any appointments were scheduled with Dr. Ibrahim and he does not have clinics on Tuesdays. Informed Mr. Lewis that I do see a referral to see Dr. Ibrahim and can schedule him for Dr. Ibrahim's next available. Mr. Lewis says he does not want to schedule at this time, wants to do more research and will call back. Offered patient direct line, patient declined.

## 2025-01-17 ENCOUNTER — TELEPHONE (OUTPATIENT)
Dept: PHARMACY | Facility: HOSPITAL | Age: 75
End: 2025-01-17
Payer: MEDICARE

## 2025-01-17 NOTE — TELEPHONE ENCOUNTER
Spoke with patient's wife who called inquiring about the chronic kidney disease . Patient was called back in October 2024 in which a voicemail was left regarding the . Patient's wife states that the patient is not on any medications and would prefer it stays that way. Wife reports that they are seeing a nephrologist in June and would like to hold off on any medications until then. I encourage them to speak to their PCP about this option in the future.    All questions were addressed, and the patient remains engaged in their current care plan.     Jun Gooden, PharmD  PGY-1 Pharmacy Resident

## 2025-05-30 LAB
ALBUMIN SERPL-MCNC: 4.4 G/DL (ref 3.6–5.1)
ALP SERPL-CCNC: 99 U/L (ref 35–144)
ALT SERPL-CCNC: 21 U/L (ref 9–46)
ANION GAP SERPL CALCULATED.4IONS-SCNC: 8 MMOL/L (CALC) (ref 7–17)
AST SERPL-CCNC: 19 U/L (ref 10–35)
BILIRUB SERPL-MCNC: 0.8 MG/DL (ref 0.2–1.2)
BUN SERPL-MCNC: 22 MG/DL (ref 7–25)
CALCIUM SERPL-MCNC: 8.9 MG/DL (ref 8.6–10.3)
CHLORIDE SERPL-SCNC: 102 MMOL/L (ref 98–110)
CHOLEST SERPL-MCNC: 212 MG/DL
CHOLEST/HDLC SERPL: 4.2 (CALC)
CO2 SERPL-SCNC: 26 MMOL/L (ref 20–32)
CREAT SERPL-MCNC: 1.54 MG/DL (ref 0.7–1.28)
EGFRCR SERPLBLD CKD-EPI 2021: 48 ML/MIN/1.73M2
ERYTHROCYTE [DISTWIDTH] IN BLOOD BY AUTOMATED COUNT: 12.2 % (ref 11–15)
EST. AVERAGE GLUCOSE BLD GHB EST-MCNC: 120 MG/DL
EST. AVERAGE GLUCOSE BLD GHB EST-SCNC: 6.6 MMOL/L
GLUCOSE SERPL-MCNC: 114 MG/DL (ref 65–99)
HBA1C MFR BLD: 5.8 %
HCT VFR BLD AUTO: 44.6 % (ref 38.5–50)
HDLC SERPL-MCNC: 51 MG/DL
HGB BLD-MCNC: 14.4 G/DL (ref 13.2–17.1)
LDLC SERPL CALC-MCNC: 138 MG/DL (CALC)
MCH RBC QN AUTO: 30.5 PG (ref 27–33)
MCHC RBC AUTO-ENTMCNC: 32.3 G/DL (ref 32–36)
MCV RBC AUTO: 94.5 FL (ref 80–100)
NONHDLC SERPL-MCNC: 161 MG/DL (CALC)
PLATELET # BLD AUTO: 219 THOUSAND/UL (ref 140–400)
PMV BLD REES-ECKER: 9.5 FL (ref 7.5–12.5)
POTASSIUM SERPL-SCNC: 4.5 MMOL/L (ref 3.5–5.3)
PROT SERPL-MCNC: 6.6 G/DL (ref 6.1–8.1)
RBC # BLD AUTO: 4.72 MILLION/UL (ref 4.2–5.8)
SODIUM SERPL-SCNC: 136 MMOL/L (ref 135–146)
TRIGL SERPL-MCNC: 116 MG/DL
WBC # BLD AUTO: 5.3 THOUSAND/UL (ref 3.8–10.8)

## 2025-06-09 ENCOUNTER — APPOINTMENT (OUTPATIENT)
Dept: NEPHROLOGY | Facility: CLINIC | Age: 75
End: 2025-06-09
Payer: MEDICARE

## 2025-06-09 ENCOUNTER — APPOINTMENT (OUTPATIENT)
Dept: LAB | Facility: HOSPITAL | Age: 75
End: 2025-06-09
Payer: MEDICARE

## 2025-06-09 VITALS
BODY MASS INDEX: 26.73 KG/M2 | WEIGHT: 231 LBS | SYSTOLIC BLOOD PRESSURE: 114 MMHG | HEART RATE: 64 BPM | HEIGHT: 78 IN | DIASTOLIC BLOOD PRESSURE: 60 MMHG

## 2025-06-09 DIAGNOSIS — N18.31 STAGE 3A CHRONIC KIDNEY DISEASE (MULTI): ICD-10-CM

## 2025-06-09 DIAGNOSIS — N18.31 STAGE 3A CHRONIC KIDNEY DISEASE (MULTI): Primary | ICD-10-CM

## 2025-06-09 LAB — PROT SERPL-MCNC: 6.8 G/DL (ref 6.4–8.2)

## 2025-06-09 PROCEDURE — 1126F AMNT PAIN NOTED NONE PRSNT: CPT | Performed by: INTERNAL MEDICINE

## 2025-06-09 PROCEDURE — 1123F ACP DISCUSS/DSCN MKR DOCD: CPT | Performed by: INTERNAL MEDICINE

## 2025-06-09 PROCEDURE — 86334 IMMUNOFIX E-PHORESIS SERUM: CPT

## 2025-06-09 PROCEDURE — 1159F MED LIST DOCD IN RCRD: CPT | Performed by: INTERNAL MEDICINE

## 2025-06-09 PROCEDURE — 3008F BODY MASS INDEX DOCD: CPT | Performed by: INTERNAL MEDICINE

## 2025-06-09 PROCEDURE — 84155 ASSAY OF PROTEIN SERUM: CPT

## 2025-06-09 PROCEDURE — 99203 OFFICE O/P NEW LOW 30 MIN: CPT | Performed by: INTERNAL MEDICINE

## 2025-06-09 PROCEDURE — 84165 PROTEIN E-PHORESIS SERUM: CPT

## 2025-06-09 PROCEDURE — 1036F TOBACCO NON-USER: CPT | Performed by: INTERNAL MEDICINE

## 2025-06-09 PROCEDURE — 83521 IG LIGHT CHAINS FREE EACH: CPT

## 2025-06-09 PROCEDURE — 86320 SERUM IMMUNOELECTROPHORESIS: CPT

## 2025-06-09 ASSESSMENT — PATIENT HEALTH QUESTIONNAIRE - PHQ9
2. FEELING DOWN, DEPRESSED OR HOPELESS: NOT AT ALL
SUM OF ALL RESPONSES TO PHQ9 QUESTIONS 1 AND 2: 0
1. LITTLE INTEREST OR PLEASURE IN DOING THINGS: NOT AT ALL

## 2025-06-09 ASSESSMENT — PAIN SCALES - GENERAL: PAINLEVEL_OUTOF10: 0-NO PAIN

## 2025-06-09 NOTE — PROGRESS NOTES
RFC: RHETT    73 yo CM  No urinary symptoms, nocturia once per night    No NSAIDs  Fish oil,, red yeast rice    PMH  Sarcoma x 2  last 2018 [employer] iliacs AIM Doxorubicin, Ifosfamide, and [place].  [place]    SOCHx  No tob  Weekly beer  Lives with wife  [place]arm     FMHx  M lung Ca  F MS  No CKD    ROS OTW Neg 10/14    NAD  Sclera AI s inj  MMM, no sores  Deferred secondary to COVID  No edema  No tremor  No rash  Appropriate    RHETT vs stage 3a CKD  No HTN  Simple renal cysts, nonobstructive stone    Low risk of progression if no proteinuria  Could consider SGLT2 for cardiovascular benefit  Screening labs today  RV PRN  Discussed cardiovascular risk of CKD  Needs GFR and UACR twice yearly for screening  Discussed risks / benefits / indications of SGLT2  Clinical pharmacy referral for SGLT2 costing / education / initiation

## 2025-06-10 ENCOUNTER — APPOINTMENT (OUTPATIENT)
Dept: PRIMARY CARE | Facility: CLINIC | Age: 75
End: 2025-06-10
Payer: MEDICARE

## 2025-06-10 VITALS
WEIGHT: 230 LBS | SYSTOLIC BLOOD PRESSURE: 98 MMHG | DIASTOLIC BLOOD PRESSURE: 63 MMHG | TEMPERATURE: 97.6 F | HEIGHT: 78 IN | BODY MASS INDEX: 26.61 KG/M2 | HEART RATE: 62 BPM | OXYGEN SATURATION: 96 % | RESPIRATION RATE: 14 BRPM

## 2025-06-10 DIAGNOSIS — R53.83 OTHER FATIGUE: ICD-10-CM

## 2025-06-10 DIAGNOSIS — R73.01 IMPAIRED FASTING GLUCOSE: ICD-10-CM

## 2025-06-10 DIAGNOSIS — Z12.5 SCREENING FOR PROSTATE CANCER: ICD-10-CM

## 2025-06-10 DIAGNOSIS — C49.21: ICD-10-CM

## 2025-06-10 DIAGNOSIS — N18.31 STAGE 3A CHRONIC KIDNEY DISEASE (MULTI): ICD-10-CM

## 2025-06-10 DIAGNOSIS — Z00.00 MEDICARE ANNUAL WELLNESS VISIT, SUBSEQUENT: Primary | ICD-10-CM

## 2025-06-10 DIAGNOSIS — E55.9 VITAMIN D DEFICIENCY: ICD-10-CM

## 2025-06-10 DIAGNOSIS — E78.49 OTHER HYPERLIPIDEMIA: ICD-10-CM

## 2025-06-10 DIAGNOSIS — E78.1 ESSENTIAL HYPERTRIGLYCERIDEMIA: ICD-10-CM

## 2025-06-10 PROBLEM — N18.9 CHRONIC KIDNEY DISEASE: Status: RESOLVED | Noted: 2025-06-10 | Resolved: 2025-06-10

## 2025-06-10 PROBLEM — N18.9 CHRONIC KIDNEY DISEASE: Status: ACTIVE | Noted: 2025-06-10

## 2025-06-10 PROBLEM — Z13.29 THYROID DISORDER SCREEN: Status: RESOLVED | Noted: 2024-01-29 | Resolved: 2025-06-10

## 2025-06-10 LAB
ALBUMIN SERPL-MCNC: 4.5 G/DL (ref 3.6–5.1)
ALBUMIN/CREAT UR: 57 MG/G CREAT
APPEARANCE UR: CLEAR
BACTERIA #/AREA URNS HPF: ABNORMAL /HPF
BASOPHILS # BLD AUTO: 29 CELLS/UL (ref 0–200)
BASOPHILS NFR BLD AUTO: 0.6 %
BILIRUB UR QL STRIP: NEGATIVE
BUN SERPL-MCNC: 19 MG/DL (ref 7–25)
BUN/CREAT SERPL: 14 (CALC) (ref 6–22)
CALCIUM SERPL-MCNC: 9 MG/DL (ref 8.6–10.3)
CAOX CRY #/AREA URNS HPF: ABNORMAL /HPF
CHLORIDE SERPL-SCNC: 105 MMOL/L (ref 98–110)
CO2 SERPL-SCNC: 25 MMOL/L (ref 20–32)
COLOR UR: YELLOW
CREAT SERPL-MCNC: 1.38 MG/DL (ref 0.7–1.28)
CREAT UR-MCNC: 122 MG/DL (ref 20–320)
CREAT UR-MCNC: 122 MG/DL (ref 20–320)
EGFRCR SERPLBLD CKD-EPI 2021: 54 ML/MIN/1.73M2
EOSINOPHIL # BLD AUTO: 192 CELLS/UL (ref 15–500)
EOSINOPHIL NFR BLD AUTO: 4 %
ERYTHROCYTE [DISTWIDTH] IN BLOOD BY AUTOMATED COUNT: 12.6 % (ref 11–15)
GLUCOSE SERPL-MCNC: 103 MG/DL (ref 65–99)
GLUCOSE UR QL STRIP: ABNORMAL
HCT VFR BLD AUTO: 41.9 % (ref 38.5–50)
HGB BLD-MCNC: 13.8 G/DL (ref 13.2–17.1)
HGB UR QL STRIP: NEGATIVE
HYALINE CASTS #/AREA URNS LPF: ABNORMAL /LPF
KAPPA LC SERPL-MCNC: 1.69 MG/DL (ref 0.33–1.94)
KAPPA LC/LAMBDA SER: 1.1 {RATIO} (ref 0.26–1.65)
KETONES UR QL STRIP: NEGATIVE
LAMBDA LC SERPL-MCNC: 1.53 MG/DL (ref 0.57–2.63)
LEUKOCYTE ESTERASE UR QL STRIP: NEGATIVE
LYMPHOCYTES # BLD AUTO: 1046 CELLS/UL (ref 850–3900)
LYMPHOCYTES NFR BLD AUTO: 21.8 %
MCH RBC QN AUTO: 31.1 PG (ref 27–33)
MCHC RBC AUTO-ENTMCNC: 32.9 G/DL (ref 32–36)
MCV RBC AUTO: 94.4 FL (ref 80–100)
MICROALBUMIN UR-MCNC: 7 MG/DL
MONOCYTES # BLD AUTO: 394 CELLS/UL (ref 200–950)
MONOCYTES NFR BLD AUTO: 8.2 %
NEUTROPHILS # BLD AUTO: 3139 CELLS/UL (ref 1500–7800)
NEUTROPHILS NFR BLD AUTO: 65.4 %
NITRITE UR QL STRIP: NEGATIVE
PH UR STRIP: 6 [PH] (ref 5–8)
PHOSPHATE SERPL-MCNC: 2.2 MG/DL (ref 2.1–4.3)
PLATELET # BLD AUTO: 210 THOUSAND/UL (ref 140–400)
PMV BLD REES-ECKER: 9.9 FL (ref 7.5–12.5)
POTASSIUM SERPL-SCNC: 4.3 MMOL/L (ref 3.5–5.3)
PROT UR QL STRIP: ABNORMAL
PROT UR-MCNC: 56 MG/DL (ref 5–25)
PROT/CREAT UR: 0.46 MG/MG CREAT (ref 0.03–0.15)
PROT/CREAT UR: 459 MG/G CREAT (ref 25–148)
RBC # BLD AUTO: 4.44 MILLION/UL (ref 4.2–5.8)
RBC #/AREA URNS HPF: ABNORMAL /HPF
SERVICE CMNT-IMP: ABNORMAL
SODIUM SERPL-SCNC: 139 MMOL/L (ref 135–146)
SP GR UR STRIP: 1.02 (ref 1–1.03)
SQUAMOUS #/AREA URNS HPF: ABNORMAL /HPF
WBC # BLD AUTO: 4.8 THOUSAND/UL (ref 3.8–10.8)
WBC #/AREA URNS HPF: ABNORMAL /HPF

## 2025-06-10 PROCEDURE — 1159F MED LIST DOCD IN RCRD: CPT | Performed by: FAMILY MEDICINE

## 2025-06-10 PROCEDURE — 1160F RVW MEDS BY RX/DR IN RCRD: CPT | Performed by: FAMILY MEDICINE

## 2025-06-10 PROCEDURE — 1036F TOBACCO NON-USER: CPT | Performed by: FAMILY MEDICINE

## 2025-06-10 PROCEDURE — G0439 PPPS, SUBSEQ VISIT: HCPCS | Performed by: FAMILY MEDICINE

## 2025-06-10 PROCEDURE — 1170F FXNL STATUS ASSESSED: CPT | Performed by: FAMILY MEDICINE

## 2025-06-10 PROCEDURE — 3008F BODY MASS INDEX DOCD: CPT | Performed by: FAMILY MEDICINE

## 2025-06-10 PROCEDURE — 1158F ADVNC CARE PLAN TLK DOCD: CPT | Performed by: FAMILY MEDICINE

## 2025-06-10 PROCEDURE — 99213 OFFICE O/P EST LOW 20 MIN: CPT | Performed by: FAMILY MEDICINE

## 2025-06-10 ASSESSMENT — ACTIVITIES OF DAILY LIVING (ADL)
DOING_HOUSEWORK: INDEPENDENT
BATHING: INDEPENDENT
DRESSING: INDEPENDENT
TAKING_MEDICATION: INDEPENDENT
GROCERY_SHOPPING: INDEPENDENT
MANAGING_FINANCES: INDEPENDENT

## 2025-06-10 ASSESSMENT — ENCOUNTER SYMPTOMS
SHORTNESS OF BREATH: 0
FEVER: 0
CHILLS: 0
ABDOMINAL PAIN: 0
ARTHRALGIAS: 0
PALPITATIONS: 0
CONFUSION: 0
CHEST TIGHTNESS: 0

## 2025-06-10 NOTE — ASSESSMENT & PLAN NOTE
Recent follow-up at [facility] at the [facility] reviewed with patient overall things were stable continue to follow with them closely.    Orders:    Follow Up In Primary Care - Established; Future

## 2025-06-10 NOTE — PROGRESS NOTES
"Subjective   Reason for Visit: Galindo Lewis is an 74 y.o. male here for a Medicare Wellness visit.     Past Medical, Surgical, and Family History reviewed and updated in chart.    Reviewed all medications by prescribing practitioner or clinical pharmacist (such as prescriptions, OTCs, herbal therapies and supplements) and documented in the medical record.    HPI patient today for follow-up of ongoing healthcare issues and review of recent lab work overalls been doing okay since he was in last he had follow-up at [facility] regarding his sarcoma of the thigh overall things there were stable.  Also he met with nephrology regarding underlying chronic kidney disease they have ordered additional testing and have referred him to clinical pharmacist to explore SGL PT meds pending cost.    Patient Care Team:  Matty Webb MD as PCP - General     Review of Systems   Constitutional:  Negative for chills and fever.   HENT:  Negative for congestion and ear pain.    Eyes:  Negative for visual disturbance.   Respiratory:  Negative for chest tightness and shortness of breath.    Cardiovascular:  Negative for chest pain and palpitations.   Gastrointestinal:  Negative for abdominal pain.   Musculoskeletal:  Negative for arthralgias.   Skin:  Negative for pallor.   Psychiatric/Behavioral:  Negative for confusion.        Objective   Vitals:  BP 98/63   Pulse 62   Temp 36.4 °C (97.6 °F)   Resp 14   Ht 1.981 m (6' 6\")   Wt 104 kg (230 lb)   SpO2 96%   BMI 26.58 kg/m²       Physical Exam  Vitals and nursing note reviewed.   Constitutional:       General: He is not in acute distress.     Appearance: Normal appearance. He is not ill-appearing.   HENT:      Head: Normocephalic and atraumatic.      Right Ear: Tympanic membrane, ear canal and external ear normal.      Left Ear: Tympanic membrane, ear canal and external ear normal.      Mouth/Throat:      Pharynx: Oropharynx is clear.   Eyes:      Extraocular " Movements: Extraocular movements intact.   Cardiovascular:      Rate and Rhythm: Normal rate and regular rhythm.      Pulses: Normal pulses.      Heart sounds: Normal heart sounds.   Pulmonary:      Effort: Pulmonary effort is normal.      Breath sounds: Normal breath sounds.   Abdominal:      General: Abdomen is flat. Bowel sounds are normal.      Palpations: Abdomen is soft.      Tenderness: There is no abdominal tenderness.   Musculoskeletal:         General: Normal range of motion.      Cervical back: Neck supple.   Skin:     General: Skin is warm.   Neurological:      Mental Status: He is alert and oriented to person, place, and time. Mental status is at baseline.   Psychiatric:         Mood and Affect: Mood normal.       Recent Results (from the past 6 weeks)   Lipid Panel    Collection Time: 05/29/25  9:11 AM   Result Value Ref Range    CHOLESTEROL, TOTAL 212 (H) <200 mg/dL    HDL CHOLESTEROL 51 > OR = 40 mg/dL    TRIGLYCERIDES 116 <150 mg/dL    LDL-CHOLESTEROL 138 (H) mg/dL (calc)    CHOL/HDLC RATIO 4.2 <5.0 (calc)    NON HDL CHOLESTEROL 161 (H) <130 mg/dL (calc)   Comprehensive Metabolic Panel    Collection Time: 05/29/25  9:11 AM   Result Value Ref Range    GLUCOSE 114 (H) 65 - 99 mg/dL    UREA NITROGEN (BUN) 22 7 - 25 mg/dL    CREATININE 1.54 (H) 0.70 - 1.28 mg/dL    EGFR 48 (L) > OR = 60 mL/min/1.73m2    SODIUM 136 135 - 146 mmol/L    POTASSIUM 4.5 3.5 - 5.3 mmol/L    CHLORIDE 102 98 - 110 mmol/L    CARBON DIOXIDE 26 20 - 32 mmol/L    ELECTROLYTE BALANCE 8 7 - 17 mmol/L (calc)    CALCIUM 8.9 8.6 - 10.3 mg/dL    PROTEIN, TOTAL 6.6 6.1 - 8.1 g/dL    ALBUMIN 4.4 3.6 - 5.1 g/dL    BILIRUBIN, TOTAL 0.8 0.2 - 1.2 mg/dL    ALKALINE PHOSPHATASE 99 35 - 144 U/L    AST 19 10 - 35 U/L    ALT 21 9 - 46 U/L   CBC    Collection Time: 05/29/25  9:11 AM   Result Value Ref Range    WHITE BLOOD CELL COUNT 5.3 3.8 - 10.8 Thousand/uL    RED BLOOD CELL COUNT 4.72 4.20 - 5.80 Million/uL    HEMOGLOBIN 14.4 13.2 - 17.1 g/dL     HEMATOCRIT 44.6 38.5 - 50.0 %    MCV 94.5 80.0 - 100.0 fL    MCH 30.5 27.0 - 33.0 pg    MCHC 32.3 32.0 - 36.0 g/dL    RDW 12.2 11.0 - 15.0 %    PLATELET COUNT 219 140 - 400 Thousand/uL    MPV 9.5 7.5 - 12.5 fL   Hemoglobin A1C    Collection Time: 05/29/25  9:11 AM   Result Value Ref Range    HEMOGLOBIN A1c 5.8 (H) <5.7 %    eAG (mg/dL) 120 mg/dL    eAG (mmol/L) 6.6 mmol/L   Albumin-Creatinine Ratio, Urine Random    Collection Time: 06/09/25  3:24 PM   Result Value Ref Range    CREATININE, RANDOM URINE      ALBUMIN, URINE      ALBUMIN/CREATININE RATIO, RANDOM URINE     Protein, Urine Random    Collection Time: 06/09/25  3:24 PM   Result Value Ref Range    CREATININE, RANDOM URINE      PROTEIN/CREATININE RATIO      PROTEIN/CREATININE RATIO      PROTEIN, TOTAL, RANDOM UR     Renal Function Panel    Collection Time: 06/09/25  3:24 PM   Result Value Ref Range    GLUCOSE 103 (H) 65 - 99 mg/dL    UREA NITROGEN (BUN) 19 7 - 25 mg/dL    CREATININE 1.38 (H) 0.70 - 1.28 mg/dL    EGFR 54 (L) > OR = 60 mL/min/1.73m2    BUN/CREATININE RATIO 14 6 - 22 (calc)    SODIUM 139 135 - 146 mmol/L    POTASSIUM 4.3 3.5 - 5.3 mmol/L    CHLORIDE 105 98 - 110 mmol/L    CARBON DIOXIDE 25 20 - 32 mmol/L    CALCIUM 9.0 8.6 - 10.3 mg/dL    PHOSPHATE (AS PHOSPHORUS) 2.2 2.1 - 4.3 mg/dL    ALBUMIN 4.5 3.6 - 5.1 g/dL   Urinalysis with Reflex Microscopic    Collection Time: 06/09/25  3:24 PM   Result Value Ref Range    COLOR YELLOW YELLOW    APPEARANCE CLEAR CLEAR    SPECIFIC GRAVITY 1.025 1.001 - 1.035    PH 6.0 5.0 - 8.0    GLUCOSE 2+ (A) NEGATIVE    BILIRUBIN NEGATIVE NEGATIVE    KETONES NEGATIVE NEGATIVE    OCCULT BLOOD NEGATIVE NEGATIVE    PROTEIN 1+ (A) NEGATIVE    NITRITE NEGATIVE NEGATIVE    LEUKOCYTE ESTERASE NEGATIVE NEGATIVE    WBC 0-5 < OR = 5 /HPF    RBC NONE SEEN < OR = 2 /HPF    SQUAMOUS EPITHELIAL CELLS NONE SEEN < OR = 5 /HPF    BACTERIA NONE SEEN NONE SEEN /HPF    CALCIUM OXALATE CRYSTALS MODERATE (A) NONE OR FEW /HPF    HYALINE CAST  NONE SEEN NONE SEEN /LPF    NOTE     CBC and Auto Differential    Collection Time: 06/09/25  3:24 PM   Result Value Ref Range    WHITE BLOOD CELL COUNT 4.8 3.8 - 10.8 Thousand/uL    RED BLOOD CELL COUNT 4.44 4.20 - 5.80 Million/uL    HEMOGLOBIN 13.8 13.2 - 17.1 g/dL    HEMATOCRIT 41.9 38.5 - 50.0 %    MCV 94.4 80.0 - 100.0 fL    MCH 31.1 27.0 - 33.0 pg    MCHC 32.9 32.0 - 36.0 g/dL    RDW 12.6 11.0 - 15.0 %    PLATELET COUNT 210 140 - 400 Thousand/uL    MPV 9.9 7.5 - 12.5 fL    ABSOLUTE NEUTROPHILS 3,139 1,500 - 7,800 cells/uL    ABSOLUTE LYMPHOCYTES 1,046 850 - 3,900 cells/uL    ABSOLUTE MONOCYTES 394 200 - 950 cells/uL    ABSOLUTE EOSINOPHILS 192 15 - 500 cells/uL    ABSOLUTE BASOPHILS 29 0 - 200 cells/uL    NEUTROPHILS 65.4 %    LYMPHOCYTES 21.8 %    MONOCYTES 8.2 %    EOSINOPHILS 4.0 %    BASOPHILS 0.6 %   Protein, Total    Collection Time: 06/09/25  3:26 PM   Result Value Ref Range    Total Protein 6.8 6.4 - 8.2 g/dL     Recent labs reviewed overall numbers are stable lipids have improved but are not yet to goal for now he does not want to go on additional medication he will continue to work on lifestyle modifications.  A1c stable continue diabetic diet and lifestyle modifications    Follow through with nephrology regarding chronic kidney disease and continue to see his specialist at the [facility] for his sarcoma    return to office in 6 months with repeat fasting labs        Assessment & Plan  Medicare annual wellness visit, subsequent  Recent labs reviewed    Immunization recommendations reviewed    PSA up-to-date         Stage 3a chronic kidney disease (Multi)  Recent nephrology consult noted they referred patient to clinical pharmacist to explore SGLPT med patient is considering pending cost.    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    Orders:    Follow Up In Primary Care - Established; Future    Other hyperlipidemia  LDL cholesterol is improved but not yet to goal we discussed adding a statin patient declined he will continue to work on lifestyle modifications recheck lipid in 6 months    Orders:    Comprehensive Metabolic Panel; Future    Lipid Panel; Future    Follow Up In Primary Care - Established; Future    Impaired fasting glucose  A1c 5.8% continue dietary modification    Orders:    Follow Up In Primary Care - Established    CBC; Future    Comprehensive Metabolic Panel; Future    Hemoglobin A1C; Future    TSH with reflex to Free T4 if abnormal; Future    Follow Up In Primary Care - Established; Future    Essential hypertriglyceridemia  Continue dietary modification    Orders:    Follow Up In Primary Care - Established    Screening for prostate cancer  Screening PSA    Orders:    Prostate Specific Antigen, Screen; Future    Vitamin D deficiency  Monitor supplement as needed    Orders:    Vitamin D 25-Hydroxy,Total (for eval of Vitamin D levels); Future    Other fatigue  TSH with reflex    Orders:    TSH with reflex to Free T4 if abnormal; Future

## 2025-06-10 NOTE — ASSESSMENT & PLAN NOTE
A1c 5.8% continue dietary modification    Orders:    Follow Up In Primary Care - Established    CBC; Future    Comprehensive Metabolic Panel; Future    Hemoglobin A1C; Future    TSH with reflex to Free T4 if abnormal; Future    Follow Up In Primary Care - Established; Future

## 2025-06-10 NOTE — ASSESSMENT & PLAN NOTE
LDL cholesterol is improved but not yet to goal we discussed adding a statin patient declined he will continue to work on lifestyle modifications recheck lipid in 6 months    Orders:    Comprehensive Metabolic Panel; Future    Lipid Panel; Future    Follow Up In Primary Care - Established; Future

## 2025-06-10 NOTE — ASSESSMENT & PLAN NOTE
Recent nephrology consult noted they referred patient to clinical pharmacist to explore SGLPT med patient is considering pending cost.    Orders:    Follow Up In Primary Care - Established    Comprehensive Metabolic Panel; Future    Hemoglobin A1C; Future    TSH with reflex to Free T4 if abnormal; Future    Follow Up In Primary Care - Established; Future

## 2025-06-11 LAB
ALBUMIN: 4.4 G/DL (ref 3.4–5)
ALPHA 1 GLOBULIN: 0.3 G/DL (ref 0.2–0.6)
ALPHA 2 GLOBULIN: 0.5 G/DL (ref 0.4–1.1)
BETA GLOBULIN: 0.7 G/DL (ref 0.5–1.2)
GAMMA GLOBULIN: 0.9 G/DL (ref 0.5–1.4)
IMMUNOFIXATION COMMENT: ABNORMAL
M-PROTEIN 1: 0.2 G/DL (ref ?–0)
PATH REVIEW - SERUM IMMUNOFIXATION: ABNORMAL
PATH REVIEW-SERUM PROTEIN ELECTROPHORESIS: ABNORMAL
PROTEIN ELECTROPHORESIS COMMENT: ABNORMAL

## 2025-06-13 ENCOUNTER — TELEMEDICINE (OUTPATIENT)
Dept: PHARMACY | Facility: HOSPITAL | Age: 75
End: 2025-06-13
Payer: MEDICARE

## 2025-06-13 DIAGNOSIS — N18.31 STAGE 3A CHRONIC KIDNEY DISEASE (MULTI): ICD-10-CM

## 2025-06-13 NOTE — PROGRESS NOTES
Pharmacist Clinic: Nephrology Management    Dilan Lewis is a 74 y.o. male was referred to Clinical Pharmacy Team for ckd/sglt2 management.     Referring Provider: Adela Dumont MD    THIS IS A NEW PATIENT APPOINTMENT. PATIENT WILL BE ESTABLISHING CARE WITH CLINICAL PHARMACY.    Appointment was completed by dilan who was reached at [phone].    REVIEW OF PAST APPNT (IF APPLICABLE):   N/a    Allergies Reviewed? No    Allergies[1]    Medications Ordered Prior to Encounter[2]    PHARMACEUTICAL ASSESSMENT:    MEDICATION RECONCILIATION    Was a medication reconciliation completed at this visit? Yes  Home Pharmacy Reviewed? Yes, describe: [facility]        Drug Interactions? No    Medication Documentation Review Audit       Reviewed by Matty Webb MD (Physician) on 06/10/25 at 0918      Medication Order Taking? Sig Documenting Provider Last Dose Status            No Medications to Display                                   DISEASE MANAGEMENT ASSESSMENT:     Hypertension History:  HTN diagnosis: no  Current Regimen  N/a  HTN at goal? yes    BP Readings from Last 6 Encounters:   06/10/25 98/63   06/09/25 114/60   12/10/24 107/65   08/05/24 110/70   04/30/24 105/64   02/01/24 105/68     Lab Review  Electrolytes:      Lab Results   Component Value Date    BILITOT 0.8 05/29/2025    CALCIUM 9.0 06/09/2025    CO2 25 06/09/2025     06/09/2025    CREATININE 1.38 (H) 06/09/2025    GLUCOSE 103 (H) 06/09/2025    ALKPHOS 99 05/29/2025    K 4.3 06/09/2025    PROT 6.8 06/09/2025     06/09/2025    AST 19 05/29/2025    ALT 21 05/29/2025    BUN 19 06/09/2025    ANIONGAP 8 05/29/2025    PHOS 2.2 06/09/2025    ALBUMIN 4.5 06/09/2025    GFRMALE 58 (A) 09/22/2022         HISTORY OF PRESENT ILLNESS    CKD ASSESSMENT    Stage: 3a (eGFR 45-59)      Last GFR:   GFR MALE   Date Value Ref Range Status   09/22/2022 58 (A) >90 mL/min/1.73m2 Final     Comment:      CALCULATIONS OF ESTIMATED GFR ARE PERFORMED    USING THE 2021 CKD-EPI STUDY REFIT EQUATION   WITHOUT THE RACE VARIABLE FOR THE IDMS-TRACEABLE   CREATININE METHODS.    https://jasn.asnjournals.org/content/early/2021/09/22/ASN.[email or web address]     05/20/2022 52 (A) >90 mL/min/1.73m2 Final     Comment:      CALCULATIONS OF ESTIMATED GFR ARE PERFORMED   USING THE 2021 CKD-EPI STUDY REFIT EQUATION   WITHOUT THE RACE VARIABLE FOR THE IDMS-TRACEABLE   CREATININE METHODS.    https://jasn.asnjournals.org/content/early/2021/09/22/ASN.[email or web address]     01/12/2022 53 (A) >90 mL/min/1.73m2 Final     Comment:      CALCULATIONS OF ESTIMATED GFR ARE PERFORMED   USING THE 2021 CKD-EPI STUDY REFIT EQUATION   WITHOUT THE RACE VARIABLE FOR THE IDMS-TRACEABLE   CREATININE METHODS.    https://jasn.asnjournals.org/content/early/2021/09/22/ASN.[email or web address]       Lab Results   Component Value Date    EGFR 54 (L) 06/09/2025    EGFR 48 (L) 05/29/2025    EGFR 52 (L) 12/02/2024       Last Albumin/Creatine Ratio:   ALBUMIN/CREATININE RATIO, RANDOM URINE   Date Value Ref Range Status   06/09/2025 57 (H) <30 mg/g creat Final     Comment:        The ADA defines abnormalities in albumin  excretion as follows:     Albuminuria Category        Result (mg/g creatinine)     Normal to Mildly increased   <30  Moderately increased            Severely increased           > OR = 300     The ADA recommends that at least two of three  specimens collected within a 3-6 month period be  abnormal before considering a patient to be  within a diagnostic category.               DISCUSSION/ASSESSMENT:   Discussed:   Farxiga Education:    - Counseled patient on Farxiga MOA, expectations, side effects, duration of therapy, administration, and monitoring parameters.  - Reviewed the benefits of SGLT-2i therapy, such as glycemic control and kidney and CV protection.  - Advised patient to practice proper  hygiene to reduce risk of UTIs or yeast infections.  - Advised patient to maintain adequate fluid intake to remain  hydrated while on SGLT2i therapy.  - Answered all patient questions and concerns.  - no part d insurance only cost assistance option [place] be through [place] and [place]. Forms emailed.    EDUCATION:   Counseled patient on MOA, expectations, side effects, duration of therapy, contraindications, administration, and monitoring parameters  Recommended maintaining a diet with a protein intake no more than 0.8 g/kg body weight per day and a sodium intake no more than 2 grams per day  For patients with HTN, recommended that we target a systolic blood pressure less than 120 mm Hg, when tolerated.  Answered all patient questions and concerns      Assessment/Plan   Problem List Items Addressed This Visit    None      RECOMMENDATIONS/PLAN    Start Farxiga 10mg qd    Last Appnt with Referring Provider: 6/9  Next Appnt with Referring Provider: doris  Clinical Pharmacist follow up: 7/18 @320  Type of Encounter: Yaw Cole PharmD    Verbal consent to manage patient's drug therapy was obtained from the patient . They were informed they may decline to participate or withdraw from participation in pharmacy services at any time.    Continue all meds under the continuation of care with the referring provider and clinical pharmacy team.         [1] No Known Allergies  [2]   No current outpatient medications on file prior to visit.     No current facility-administered medications on file prior to visit.

## 2025-07-18 ENCOUNTER — APPOINTMENT (OUTPATIENT)
Dept: PHARMACY | Facility: HOSPITAL | Age: 75
End: 2025-07-18
Payer: MEDICARE

## 2025-07-18 NOTE — PROGRESS NOTES
Pharmacist Clinic: Nephrology Management    Dilan Lewis is a 74 y.o. male was referred to Clinical Pharmacy Team for ckd/sglt2 management.     Referring Provider: Adela Dumont MD    THIS IS A NEW PATIENT APPOINTMENT. PATIENT WILL BE ESTABLISHING CARE WITH CLINICAL PHARMACY.    Appointment was completed by dilan who was reached at [phone].    REVIEW OF PAST APPNT (IF APPLICABLE):   N/a    Allergies Reviewed? No    Allergies[1]    Medications Ordered Prior to Encounter[2]    PHARMACEUTICAL ASSESSMENT:    MEDICATION RECONCILIATION    Was a medication reconciliation completed at this visit? Yes  Home Pharmacy Reviewed? Yes, describe: [facility]        Drug Interactions? No    Medication Documentation Review Audit       Reviewed by Matty Webb MD (Physician) on 06/10/25 at 0918      Medication Order Taking? Sig Documenting Provider Last Dose Status            No Medications to Display                                   DISEASE MANAGEMENT ASSESSMENT:     Hypertension History:  HTN diagnosis: no  Current Regimen  N/a  HTN at goal? yes    BP Readings from Last 6 Encounters:   06/10/25 98/63   06/09/25 114/60   12/10/24 107/65   08/05/24 110/70   04/30/24 105/64   02/01/24 105/68     Lab Review  Electrolytes:      Lab Results   Component Value Date    BILITOT 0.8 05/29/2025    CALCIUM 9.0 06/09/2025    CO2 25 06/09/2025     06/09/2025    CREATININE 1.38 (H) 06/09/2025    GLUCOSE 103 (H) 06/09/2025    ALKPHOS 99 05/29/2025    K 4.3 06/09/2025    PROT 6.8 06/09/2025     06/09/2025    AST 19 05/29/2025    ALT 21 05/29/2025    BUN 19 06/09/2025    ANIONGAP 8 05/29/2025    PHOS 2.2 06/09/2025    ALBUMIN 4.5 06/09/2025    GFRMALE 58 (A) 09/22/2022         HISTORY OF PRESENT ILLNESS    CKD ASSESSMENT    Stage: 3a (eGFR 45-59)      Last GFR:   GFR MALE   Date Value Ref Range Status   09/22/2022 58 (A) >90 mL/min/1.73m2 Final     Comment:      CALCULATIONS OF ESTIMATED GFR ARE PERFORMED    USING THE 2021 CKD-EPI STUDY REFIT EQUATION   WITHOUT THE RACE VARIABLE FOR THE IDMS-TRACEABLE   CREATININE METHODS.    https://jasn.asnjournals.org/content/early/2021/09/22/ASN.[email or web address]     05/20/2022 52 (A) >90 mL/min/1.73m2 Final     Comment:      CALCULATIONS OF ESTIMATED GFR ARE PERFORMED   USING THE 2021 CKD-EPI STUDY REFIT EQUATION   WITHOUT THE RACE VARIABLE FOR THE IDMS-TRACEABLE   CREATININE METHODS.    https://jasn.asnjournals.org/content/early/2021/09/22/ASN.[email or web address]     01/12/2022 53 (A) >90 mL/min/1.73m2 Final     Comment:      CALCULATIONS OF ESTIMATED GFR ARE PERFORMED   USING THE 2021 CKD-EPI STUDY REFIT EQUATION   WITHOUT THE RACE VARIABLE FOR THE IDMS-TRACEABLE   CREATININE METHODS.    https://jasn.asnjournals.org/content/early/2021/09/22/ASN.[email or web address]       Lab Results   Component Value Date    EGFR 54 (L) 06/09/2025    EGFR 48 (L) 05/29/2025    EGFR 52 (L) 12/02/2024       Last Albumin/Creatine Ratio:   ALBUMIN/CREATININE RATIO, RANDOM URINE   Date Value Ref Range Status   06/09/2025 57 (H) <30 mg/g creat Final     Comment:        The ADA defines abnormalities in albumin  excretion as follows:     Albuminuria Category        Result (mg/g creatinine)     Normal to Mildly increased   <30  Moderately increased            Severely increased           > OR = 300     The ADA recommends that at least two of three  specimens collected within a 3-6 month period be  abnormal before considering a patient to be  within a diagnostic category.               DISCUSSION/ASSESSMENT:   Discussed:   Farxiga Education:    - Counseled patient on Farxiga MOA, expectations, side effects, duration of therapy, administration, and monitoring parameters.  - Reviewed the benefits of SGLT-2i therapy, such as glycemic control and kidney and CV protection.  - Advised patient to practice proper  hygiene to reduce risk of UTIs or yeast infections.  - Advised patient to maintain adequate fluid intake to remain  hydrated while on SGLT2i therapy.  - Answered all patient questions and concerns.  - [place] and [place] approved through 12/31/2025      EDUCATION:   Counseled patient on MOA, expectations, side effects, duration of therapy, contraindications, administration, and monitoring parameters  Recommended maintaining a diet with a protein intake no more than 0.8 g/kg body weight per day and a sodium intake no more than 2 grams per day  For patients with HTN, recommended that we target a systolic blood pressure less than 120 mm Hg, when tolerated.  Answered all patient questions and concerns      Assessment/Plan   Problem List Items Addressed This Visit    None      RECOMMENDATIONS/PLAN    Start Farxiga 10mg qd    Last Appnt with Referring Provider: 6/9  Next Appnt with Referring Provider: doris  Clinical Pharmacist follow up: 7/18 @320  Type of Encounter: Yaw Cole PharmD    Verbal consent to manage patient's drug therapy was obtained from the patient . They were informed they may decline to participate or withdraw from participation in pharmacy services at any time.    Continue all meds under the continuation of care with the referring provider and clinical pharmacy team.         [1] No Known Allergies  [2]   No current outpatient medications on file prior to visit.     No current facility-administered medications on file prior to visit.

## 2025-07-21 RX ORDER — DAPAGLIFLOZIN 10 MG/1
10 TABLET, FILM COATED ORAL EVERY 24 HOURS
COMMUNITY

## 2025-12-10 ENCOUNTER — APPOINTMENT (OUTPATIENT)
Dept: PRIMARY CARE | Facility: CLINIC | Age: 75
End: 2025-12-10
Payer: MEDICARE